# Patient Record
Sex: FEMALE | Race: BLACK OR AFRICAN AMERICAN | URBAN - METROPOLITAN AREA
[De-identification: names, ages, dates, MRNs, and addresses within clinical notes are randomized per-mention and may not be internally consistent; named-entity substitution may affect disease eponyms.]

---

## 2017-01-09 ENCOUNTER — HISTORICAL (OUTPATIENT)
Dept: RADIOLOGY | Facility: HOSPITAL | Age: 37
End: 2017-01-09

## 2021-03-08 ENCOUNTER — HISTORICAL (OUTPATIENT)
Dept: RADIOLOGY | Facility: HOSPITAL | Age: 41
End: 2021-03-08

## 2021-03-18 ENCOUNTER — HISTORICAL (OUTPATIENT)
Dept: ADMINISTRATIVE | Facility: HOSPITAL | Age: 41
End: 2021-03-18

## 2021-03-18 LAB — SARS-COV-2 RNA RESP QL NAA+PROBE: NOT DETECTED

## 2021-03-22 ENCOUNTER — HISTORICAL (OUTPATIENT)
Dept: SURGERY | Facility: HOSPITAL | Age: 41
End: 2021-03-22

## 2022-04-10 ENCOUNTER — HISTORICAL (OUTPATIENT)
Dept: ADMINISTRATIVE | Facility: HOSPITAL | Age: 42
End: 2022-04-10
Payer: MEDICAID

## 2022-04-28 VITALS
WEIGHT: 120.13 LBS | DIASTOLIC BLOOD PRESSURE: 83 MMHG | HEIGHT: 62 IN | SYSTOLIC BLOOD PRESSURE: 122 MMHG | BODY MASS INDEX: 22.11 KG/M2 | OXYGEN SATURATION: 100 %

## 2022-05-03 NOTE — HISTORICAL [facility] CERNER
This is a historical note converted from [place]. Formatting and pictures may have been removed.  Please reference [place] for original formatting and attached multimedia. Indication for Surgery  41-year-old woman with history of hypertension,?anxiety, depression,?dysmenorrhea, possible endometriosis?who presented to clinic due to?a?right periareolar?lump?present for approximately 2 months.? At the last visit, her most recent mammogram was from 2019. ?She underwent a diagnostic mammogram and ultrasound since then.? The scans reveal?a BI-RADS 2?lesion?on the right consistent with a sebaceous?cyst.? The patient states that the mass is painful.??She was able to express?thick white?material from the site.? She perseverates on the mass daily?and states that it is affecting her activities of daily living.? Decision was made to proceed with excision of the mass.  Preoperative Diagnosis  Right areolar mass  Postoperative Diagnosis  Right areolar mass  Operation  Right breast mass excisional biopsy  Surgeon(s)  Terrance Miller MD (PGY-3)  Rosaline Mcmullen MD  Assistant  HEIDE, MS3  Anesthesia  LMA  Estimated Blood Loss  5 cc  Findings  Right lateral areolar mass?with expression of keratinaceous fluid  Specimen(s)  Right areolar mass  Complications  None  Technique  Risk, benefits, and alternatives were discussed with the patient and written consent was obtained. ?The patient understood that she was at high risk of recurrence.? The patient was taken to the operating room, underwent LMA, prepped and draped in usual sterile fashion, given preoperative antibiotics,?and timeout was performed.? An elliptical incision was made?lateral to the right nipple within the areola. ?Dissection was carried down until the?mass was encountered. ?The mass was dissected out circumferentially. ?During dissection, the mass was entered and keratinaceous?white material was obtained.? The mass?was further dissected out and passed off the field as specimen.? There  did not appear to be any residual?contents at the site. ?Hemostasis was achieved with electrocautery.? The site was then closed with?deep dermal 3-0 Monocryl and a 4-0 Monocryl running subcuticular suture.? Sterile dressing was applied. ?The patient was awoken from anesthesia and taken to the PACU in stable condition. ?The patient tolerated the procedure well and all counts were correct x2. ?Dr. Mcmullen was present for all critical portions of the procedure.  ?  I agree with resident documentation. I was physically present, supervised resident, ?and discussed plan of care.

## 2022-05-03 NOTE — HISTORICAL [facility] CERNER
This is a historical note converted from [place]. Formatting and pictures may have been removed.  Please reference [place] for original formatting and attached multimedia. Interval H&P  ?   H&P was reviewed there are no changes to be made. NPO since midnight. Patient consented and marked for surgery.  ?   To OR for excision of Right breast mass  ?   Curtis Griffin MD PGY1  [facility]  ?  ?  Chief Complaint  3 month F/U  History of Present Illness  41-year-old woman with history of hypertension,?anxiety, depression,?dysmenorrhea, possible endometriosis?who presented to clinic due to?a?right periareolar?lump?present for approximately 2 months.? At the last visit, her most recent mammogram was from 2019. ?She underwent a diagnostic mammogram and ultrasound since then.? The scans reveal?a BI-RADS 2?lesion?on the right consistent with a sebaceous?cyst.? The patient states that the mass is painful. ?2 weeks ago she?was able to express?thick white?material from the site.? She perseverates on the mass daily?and states that it is affecting her activities of daily living.  ?  PMH: HTN, anxiety  PSH: removal breast cyst?on the left, tubal ligation, C section  SH: Half pack per day smoker  Review of Systems  Negative except for what is stated in the HPI  Physical Exam  ???Vitals & Measurements  ??HR:?78(Peripheral)? RR:?18? BP:?147/96? SpO2:?100%?  ??HT:?154.00?cm? WT:?55.700?kg? BMI:?23.49? LMP:?03/10/2021 00:00 CST?  General: No acute distress  CV: Regular rate  Respiratory: Normal work of breathing, no shortness of breath  Abdomen: Soft, nontender, nondistended  Right breast and axilla:?No axillary, supraclavicular, or infraclavicular lymphadenopathy.? Right breast with no palpable masses except for?an approximately?2 cm?mass at the?11 oclock position?of the areola. ?No active drainage.? No overlying skin changes.  ?  Diagnostic mammogram and ultrasound:  IMPRESSION: BENIGN  Right Breast: Benign (BI-RADS 2)?  Left  Breast: Negative (BI-RADS 1)  ?  Recommendations:  Right upper outer periareolar breast 1 x 0.4 x 1.2 cm dermal mass at the 11:00 position, 1 cm from the nipple, which tracks to the more superficial layer of the skin and with associated non-circumscribed margins and adjacent skin thickening. Findings are consistent with a partially ruptured sebaceous/epidermal inclusion cyst. Recommend clinical follow-up/management.  ?  Recommend continued annual screening mammography (with Digital Breast Tomosynthesis) due in 3/2022, according to [employer] guidelines.  Assessment/Plan  ?  41-year-old woman?with a right periareolar mass?causing distress. ?Imaging consistent with sebaceous cyst.  ?  -Operating room on 3/22/2021?for excision of the mass  -Risk, benefits, and alternatives were discussed with the patient and written consent was obtained.? The patient understands that there is high risk of recurrence.? She would like to proceed with the procedure.  ?  Terrance Miller MD  [facility], PGY-III  ?  I agree with resident documentation. I was physically present, supervised resident, ?and discussed plan of care.  to OR for excision of right breast mass Problem List/Past Medical History  Ongoing  ??Anxiety  ?Anxiety and depression  ?Depression  ?Dysmenorrhea  ?Elevated BP  ?HTN (hypertension)  ?Hypertension  ?Kidney stones  ?Panic attacks  ?Seizure  ?Tobacco user  ?Visit for screening mammogram  ?Well adult exam  Historical  ??Alcoholism  ??Anemia  ??Migraine  ??Tobacco abuse  Procedure/Surgical HistoryDrainage external ear, abscess or hematoma; simple. (2014)  Other incision of external ear (2014)  Breast procedure  BTL   x 2   ?  Medications  ?Colace 100 mg oral capsule, 100 mg= 1 cap(s), Oral, BID, PRN,? ?Still taking, not as prescribed: Last Dose Date/Time Unknown  ?Fergon 240 mg oral tablet, 240 mg= 1 tab(s), Oral, Daily  ?hydrochlorothiazide 25 mg oral tablet, 25 mg= 1 tab(s),  Oral, Daily,? ?Not taking: out of Rx  ?ProAir HFA 90 mcg/inh inhalation aerosol with adapter, 2 puff(s), INH, QID, PRN,? ?Not taking: Last Dose Date/Time Unknown  ?Toradol 30 mg/mL injectable solution, 30 mg= 1 mL, IV Push, Once  Allergies  PHENobarbital  lisinopril?(Swelling of face...)  Social History  Abuse/Neglect  ?No, 03/11/2021  Alcohol - Denies Alcohol Use, 08/01/2012  ?Past, 02/18/2021  Employment/School  ?Employed, Activity level: Moderate physical work. Operates hazardous equipment: No., 12/19/2016  ?Employed, 10/18/2016  ?Unemployed, 04/19/2016  Exercise  ?Exercise type: Walking., 04/19/2016  Home/Environment  ?Lives with Children, BOYFRIEND., 04/19/2016  Nutrition/Health  ?Type of diet: NO RESTRICTION. Regular, Eating disorders: Anorexia nervosa., 04/19/2016  Substance Use - Denies Substance Abuse, 08/01/2012  ?Never, 11/03/2018  ?Never, 04/19/2016  Tobacco - High Risk, 08/01/2012  ?5-9 cigarettes (between 1/4 to 1/2 pack)/day in last 30 days, Cigarettes, No, 03/11/2021  Family History  ?Congestive heart disease.: Father.  ?Cystic fibrosis.: Mother.  ?Heart failure.: Father.  ?Hypertension.: Mother and Father.  Immunizations  ?Vaccine ?Date ?Status   ?tetanus/diphtheria/pertussis, acel(Tdap) 05/06/2017 Given   ?influenza virus vaccine, inactivated 10/18/2016 Given   ?  Health Maintenance  Health Maintenance  ???Pending?(in the next year)  ??? ??OverDue  ??? ? ? ?Cervical Cancer Screening due??12/08/19??and every 3??year(s)  ??? ? ? ?Influenza Vaccine due??10/01/20??and every 1??day(s)  ??? ? ? ?Smoking Cessation due??01/01/21??and every 1??year(s)  ??? ? ? ?Alcohol Misuse Screening due??01/02/21??and every 1??year(s)  ??? ??Due?  ??? ? ? ?ADL Screening due??03/11/21??and every 1??year(s)  ??? ? ? ?Hypertension Management-Education due??03/11/21??and every 1??year(s)  ??? ? ? ?Lipid Screening due??03/11/21??Unknown Frequency  ??? ??Due In Future?  ??? ? ? ?Obesity Screening not due until??01/01/22??and  every 1??year(s)  ??? ? ? ?Hypertension Management-BMP not due until??02/21/22??and every 1??year(s)  ???Satisfied?(in the past 1 year)  ??? ??Satisfied?  ??? ? ? ?Blood Pressure Screening on??03/11/21.??Satisfied by Yady Landa  ??? ? ? ?Body Mass Index Check on??03/11/21.??Satisfied by Yady Landa  ??? ? ? ?Breast Cancer Screening on??03/08/21.??Satisfied by Chacha Corbett  ??? ? ? ?Depression Screening on??03/11/21.??Satisfied by Yady Landa  ??? ? ? ?Hypertension Management-Blood Pressure on??03/11/21.??Satisfied by Yady Landa  ??? ? ? ?Influenza Vaccine on??03/11/21.??Satisfied by Yady Landa  ??? ? ? ?Obesity Screening on??03/11/21.??Satisfied by Yady Landa  ??? ? ? ?Smoking Cessation on??03/19/20.??Satisfied by Luz Mayer RN  ?   ?  ?  Addendum by Benedict GAY, Rosaline Henderson on March 11, 2021 14:32:46 CST (Verified)  I agree with resident documentation. I was physically present, supervised resident, ?and discussed plan of care.  agree with excision of skin lesion

## 2023-10-16 ENCOUNTER — DOCUMENTATION ONLY (OUTPATIENT)
Dept: RESEARCH | Facility: HOSPITAL | Age: 43
End: 2023-10-16
Payer: MEDICAID